# Patient Record
Sex: MALE | Race: WHITE | NOT HISPANIC OR LATINO | Employment: FULL TIME | URBAN - NONMETROPOLITAN AREA
[De-identification: names, ages, dates, MRNs, and addresses within clinical notes are randomized per-mention and may not be internally consistent; named-entity substitution may affect disease eponyms.]

---

## 2023-06-16 ASSESSMENT — PROMIS GLOBAL HEALTH SCALE
RATE_PHYSICAL_HEALTH: VERY GOOD
RATE_QUALITY_OF_LIFE: GOOD
RATE_SOCIAL_SATISFACTION: FAIR
CARRYOUT_PHYSICAL_ACTIVITIES: COMPLETELY
RATE_AVERAGE_FATIGUE: MILD
CARRYOUT_SOCIAL_ACTIVITIES: VERY GOOD
EMOTIONAL_PROBLEMS: RARELY
RATE_GENERAL_HEALTH: VERY GOOD
RATE_AVERAGE_PAIN: 2
RATE_MENTAL_HEALTH: GOOD

## 2023-06-17 PROBLEM — E78.2 MIXED HYPERLIPIDEMIA: Status: ACTIVE | Noted: 2023-06-17

## 2023-06-17 PROBLEM — B35.1 ONYCHOMYCOSIS OF TOENAIL: Status: ACTIVE | Noted: 2023-06-17

## 2023-06-17 PROBLEM — K64.8 INTERNAL HEMORRHOIDS: Status: ACTIVE | Noted: 2023-06-17

## 2023-06-17 PROBLEM — K64.4 EXTERNAL HEMORRHOIDS: Status: ACTIVE | Noted: 2023-06-17

## 2023-06-17 PROBLEM — K57.90 DIVERTICULOSIS: Status: ACTIVE | Noted: 2023-06-17

## 2023-06-17 RX ORDER — MULTIVITAMIN
TABLET ORAL
COMMUNITY

## 2023-06-17 RX ORDER — TERBINAFINE HYDROCHLORIDE 250 MG/1
1 TABLET ORAL DAILY
COMMUNITY
Start: 2022-08-03 | End: 2023-06-19 | Stop reason: ALTCHOICE

## 2023-06-19 ENCOUNTER — OFFICE VISIT (OUTPATIENT)
Dept: PRIMARY CARE | Facility: CLINIC | Age: 63
End: 2023-06-19
Payer: COMMERCIAL

## 2023-06-19 ENCOUNTER — LAB (OUTPATIENT)
Dept: LAB | Facility: LAB | Age: 63
End: 2023-06-19
Payer: COMMERCIAL

## 2023-06-19 VITALS
WEIGHT: 151.8 LBS | BODY MASS INDEX: 23.83 KG/M2 | DIASTOLIC BLOOD PRESSURE: 75 MMHG | HEIGHT: 67 IN | SYSTOLIC BLOOD PRESSURE: 122 MMHG | OXYGEN SATURATION: 97 % | RESPIRATION RATE: 12 BRPM | HEART RATE: 59 BPM | TEMPERATURE: 97.7 F

## 2023-06-19 DIAGNOSIS — B35.1 ONYCHOMYCOSIS OF TOENAIL: Primary | ICD-10-CM

## 2023-06-19 DIAGNOSIS — E78.2 MIXED HYPERLIPIDEMIA: Primary | ICD-10-CM

## 2023-06-19 DIAGNOSIS — E78.2 MIXED HYPERLIPIDEMIA: ICD-10-CM

## 2023-06-19 LAB
ALANINE AMINOTRANSFERASE (SGPT) (U/L) IN SER/PLAS: 21 U/L (ref 10–52)
ALBUMIN (G/DL) IN SER/PLAS: 4.5 G/DL (ref 3.4–5)
ALKALINE PHOSPHATASE (U/L) IN SER/PLAS: 45 U/L (ref 33–136)
ANION GAP IN SER/PLAS: 12 MMOL/L (ref 10–20)
ASPARTATE AMINOTRANSFERASE (SGOT) (U/L) IN SER/PLAS: 21 U/L (ref 9–39)
BILIRUBIN TOTAL (MG/DL) IN SER/PLAS: 0.7 MG/DL (ref 0–1.2)
CALCIUM (MG/DL) IN SER/PLAS: 9.8 MG/DL (ref 8.6–10.6)
CARBON DIOXIDE, TOTAL (MMOL/L) IN SER/PLAS: 30 MMOL/L (ref 21–32)
CHLORIDE (MMOL/L) IN SER/PLAS: 105 MMOL/L (ref 98–107)
CHOLESTEROL (MG/DL) IN SER/PLAS: 248 MG/DL (ref 0–199)
CHOLESTEROL IN HDL (MG/DL) IN SER/PLAS: 104.1 MG/DL
CHOLESTEROL/HDL RATIO: 2.4
CREATININE (MG/DL) IN SER/PLAS: 1.05 MG/DL (ref 0.5–1.3)
GFR MALE: 80 ML/MIN/1.73M2
GLUCOSE (MG/DL) IN SER/PLAS: 97 MG/DL (ref 74–99)
LDL: 136 MG/DL (ref 0–99)
POTASSIUM (MMOL/L) IN SER/PLAS: 4.7 MMOL/L (ref 3.5–5.3)
PROTEIN TOTAL: 7.3 G/DL (ref 6.4–8.2)
SODIUM (MMOL/L) IN SER/PLAS: 142 MMOL/L (ref 136–145)
TRIGLYCERIDE (MG/DL) IN SER/PLAS: 38 MG/DL (ref 0–149)
UREA NITROGEN (MG/DL) IN SER/PLAS: 29 MG/DL (ref 6–23)
VLDL: 8 MG/DL (ref 0–40)

## 2023-06-19 PROCEDURE — 1036F TOBACCO NON-USER: CPT | Performed by: FAMILY MEDICINE

## 2023-06-19 PROCEDURE — 99396 PREV VISIT EST AGE 40-64: CPT | Performed by: FAMILY MEDICINE

## 2023-06-19 PROCEDURE — 36415 COLL VENOUS BLD VENIPUNCTURE: CPT

## 2023-06-19 PROCEDURE — 80061 LIPID PANEL: CPT

## 2023-06-19 PROCEDURE — 80053 COMPREHEN METABOLIC PANEL: CPT

## 2023-06-19 RX ORDER — TERBINAFINE HYDROCHLORIDE 250 MG/1
250 TABLET ORAL DAILY
Qty: 30 TABLET | Refills: 0 | Status: SHIPPED | OUTPATIENT
Start: 2023-06-19 | End: 2023-07-19

## 2023-06-19 NOTE — PROGRESS NOTES
"Subjective   Patient ID: Aaron Johnston is a 63 y.o. male who presents for Annual Exam.  HPI  Patient of Dr. Rivero, scheduled for their CPE  Last PE was 8/1/22 - make sure insurance is okay with calendar year and not full year.   UTD on vaccines  Labs 8/2022    Had an episode of lightheadedess last week - checked BP and was 155/85  Up about 5 lbs from last year per patient    Decreased exercise due to being \"tired of running\", considering kayaking, rides bike and walks. Also does some resistance training  No chest pain or SOB with exercise  Drinking more water    No constipation or diarrhea    Likes wine, but has been cutting  Diet \"sucks\" per patient, does not like fruits and vegetables, gets at least one serving per day    Had rotator cuff repaired in 2008 - has some aches and pains    Has pain in the feet a lot of days, but only with barefoot walking, better with any shoes.  Has toenail fungus  Has pain in the right foot third toe.   Has never seen podiatry.     Review of Systems    Objective   Physical Exam  Constitutional:       General: He is not in acute distress.  HENT:      Head: Normocephalic and atraumatic.      Right Ear: Tympanic membrane, ear canal and external ear normal.      Left Ear: Tympanic membrane, ear canal and external ear normal.      Nose: Nose normal.      Mouth/Throat:      Mouth: Mucous membranes are moist.      Pharynx: No posterior oropharyngeal erythema.   Eyes:      General: No scleral icterus.     Extraocular Movements: Extraocular movements intact.      Pupils: Pupils are equal, round, and reactive to light.   Cardiovascular:      Rate and Rhythm: Normal rate and regular rhythm.      Pulses: Normal pulses.      Heart sounds: No murmur heard.  Pulmonary:      Effort: Pulmonary effort is normal. No respiratory distress.      Breath sounds: Normal breath sounds. No wheezing.   Abdominal:      General: Bowel sounds are normal. There is no distension.      Palpations: Abdomen is soft. "      Tenderness: There is no abdominal tenderness.   Musculoskeletal:         General: Normal range of motion.      Cervical back: Neck supple. No rigidity.      Right lower leg: No edema.      Left lower leg: No edema.   Lymphadenopathy:      Cervical: No cervical adenopathy.   Skin:     General: Skin is warm and dry.      Findings: No rash.   Neurological:      General: No focal deficit present.      Mental Status: He is alert and oriented to person, place, and time.   Psychiatric:         Mood and Affect: Mood normal.         Thought Content: Thought content normal.         Assessment/Plan   Problem List Items Addressed This Visit       Mixed hyperlipidemia - Primary    Relevant Orders    Lipid panel    Comprehensive metabolic panel

## 2023-06-19 NOTE — PATIENT INSTRUCTIONS
[facility] (Dr. Hendricks or partners) - [address] between our office and [place].   Dr. Woods with  - various locations - we can help schedule if needed.    We will complete your labs and form and send back in for you.     You are eligible for a coronary CT scan to look at the blood vessels.

## 2023-07-17 ENCOUNTER — TELEPHONE (OUTPATIENT)
Dept: PRIMARY CARE | Facility: CLINIC | Age: 63
End: 2023-07-17
Payer: COMMERCIAL

## 2023-07-17 NOTE — TELEPHONE ENCOUNTER
Pt had asked us to fill forms out for based on his 2023 CPE. I believe there was some confusion upon review of the previous messages. We needed him to schedule in advance for his next appt next year, pt thought he needed another apt this year to complete these forms I called pt to try to explain this to him but had to leave a message.

## 2023-07-17 NOTE — TELEPHONE ENCOUNTER
----- Message from Topher Johnston sent at 7/17/2023  2:58 PM EDT -----  Regarding: appointment  Contact: [phone]  I just had an appointment with Dr. Pedersen.  Doesn't that count?

## 2024-07-17 ENCOUNTER — APPOINTMENT (OUTPATIENT)
Dept: PRIMARY CARE | Facility: CLINIC | Age: 64
End: 2024-07-17
Payer: COMMERCIAL

## 2024-07-22 ENCOUNTER — APPOINTMENT (OUTPATIENT)
Dept: PRIMARY CARE | Facility: CLINIC | Age: 64
End: 2024-07-22
Payer: COMMERCIAL

## 2024-07-24 NOTE — PROGRESS NOTES
"Subjective   Patient ID: Topher Johnston \"Skip\" is a 64 y.o. male who presents for Annual Exam (Aaron is here for his annual exam. Pt states he has some discomfort in his back recently. This is not a new issue however he would like to discuss it to see if there is anything else he can do. ).  HPI  Patient of Dr. Rivero scheduled for CPE  History of elevated cholesterol  History of onychomycosis - did the lamisil briefly and helped a little, but not gone    Due for lipid/CMP/PSA  Colonoscopy 2019 - normal - 10 years  Tdap 2021  Will be due for prevnar 20 next year  Has had shingles vaccine x2    Rotator cuff surgery - 1/2008 - add to surg history    Low back, sharp shooting - sharp pain is fleeting, but will need to lay down after - ibuprofen, rest will make it go away in a day  Does some inversion at home, still able to exercise.   Would like to know if other options for him to keep this away in the long term    Had plantar fasciitis last year which is now resolved.    Still running, biking, now kayaking      Objective   Visit Vitals  /77 (BP Location: Right arm, Patient Position: Sitting, BP Cuff Size: Adult)   Pulse 61   Temp 36.8 °C (98.3 °F) (Temporal)   Ht 1.727 m (5' 8\")   Wt 70.3 kg (155 lb)   SpO2 95%   BMI 23.57 kg/m²   Smoking Status Never   BSA 1.84 m²      Physical Exam  Constitutional:       General: He is not in acute distress.  HENT:      Head: Normocephalic and atraumatic.      Right Ear: Tympanic membrane, ear canal and external ear normal.      Left Ear: Tympanic membrane, ear canal and external ear normal.      Nose: Nose normal.      Mouth/Throat:      Mouth: Mucous membranes are moist.      Pharynx: No posterior oropharyngeal erythema.   Eyes:      General: No scleral icterus.     Extraocular Movements: Extraocular movements intact.      Pupils: Pupils are equal, round, and reactive to light.   Cardiovascular:      Rate and Rhythm: Normal rate and regular rhythm.      Pulses: Normal pulses. "      Heart sounds: No murmur heard.  Pulmonary:      Effort: Pulmonary effort is normal. No respiratory distress.      Breath sounds: Normal breath sounds. No wheezing.   Abdominal:      General: Bowel sounds are normal. There is no distension.      Palpations: Abdomen is soft.      Tenderness: There is no abdominal tenderness.   Musculoskeletal:         General: Normal range of motion.      Cervical back: Neck supple. No rigidity.      Right lower leg: No edema.      Left lower leg: No edema.   Lymphadenopathy:      Cervical: No cervical adenopathy.   Skin:     General: Skin is warm and dry.      Findings: No rash.   Neurological:      General: No focal deficit present.      Mental Status: He is alert and oriented to person, place, and time.   Psychiatric:         Mood and Affect: Mood normal.         Thought Content: Thought content normal.         Assessment/Plan   Problem List Items Addressed This Visit    None  Visit Diagnoses       Routine general medical examination at a health care facility    -  Primary    Relevant Orders    Prostate Specific Antigen, Screen    Comprehensive Metabolic Panel    Lipid Panel    Chronic bilateral low back pain with sciatica, sciatica laterality unspecified        Relevant Orders    Referral to [facility]    Referral to Physical Therapy

## 2024-07-29 ENCOUNTER — APPOINTMENT (OUTPATIENT)
Dept: PRIMARY CARE | Facility: CLINIC | Age: 64
End: 2024-07-29
Payer: COMMERCIAL

## 2024-07-29 ENCOUNTER — LAB (OUTPATIENT)
Dept: LAB | Facility: LAB | Age: 64
End: 2024-07-29
Payer: COMMERCIAL

## 2024-07-29 VITALS
HEART RATE: 61 BPM | SYSTOLIC BLOOD PRESSURE: 126 MMHG | BODY MASS INDEX: 23.49 KG/M2 | HEIGHT: 68 IN | DIASTOLIC BLOOD PRESSURE: 77 MMHG | TEMPERATURE: 98.3 F | WEIGHT: 155 LBS | OXYGEN SATURATION: 95 %

## 2024-07-29 DIAGNOSIS — M54.40 CHRONIC BILATERAL LOW BACK PAIN WITH SCIATICA, SCIATICA LATERALITY UNSPECIFIED: ICD-10-CM

## 2024-07-29 DIAGNOSIS — G89.29 CHRONIC BILATERAL LOW BACK PAIN WITH SCIATICA, SCIATICA LATERALITY UNSPECIFIED: ICD-10-CM

## 2024-07-29 DIAGNOSIS — Z00.00 ROUTINE GENERAL MEDICAL EXAMINATION AT A HEALTH CARE FACILITY: Primary | ICD-10-CM

## 2024-07-29 DIAGNOSIS — Z00.00 ROUTINE GENERAL MEDICAL EXAMINATION AT A HEALTH CARE FACILITY: ICD-10-CM

## 2024-07-29 PROCEDURE — 80061 LIPID PANEL: CPT

## 2024-07-29 PROCEDURE — 99396 PREV VISIT EST AGE 40-64: CPT | Performed by: FAMILY MEDICINE

## 2024-07-29 PROCEDURE — 36415 COLL VENOUS BLD VENIPUNCTURE: CPT

## 2024-07-29 PROCEDURE — 84153 ASSAY OF PSA TOTAL: CPT

## 2024-07-29 PROCEDURE — 80053 COMPREHEN METABOLIC PANEL: CPT

## 2024-07-29 PROCEDURE — 3008F BODY MASS INDEX DOCD: CPT | Performed by: FAMILY MEDICINE

## 2024-07-29 PROCEDURE — 1036F TOBACCO NON-USER: CPT | Performed by: FAMILY MEDICINE

## 2024-07-29 ASSESSMENT — PROMIS GLOBAL HEALTH SCALE
CARRYOUT_PHYSICAL_ACTIVITIES: COMPLETELY
RATE_SOCIAL_SATISFACTION: GOOD
EMOTIONAL_PROBLEMS: RARELY
RATE_MENTAL_HEALTH: VERY GOOD
RATE_AVERAGE_FATIGUE: MILD
RATE_GENERAL_HEALTH: VERY GOOD
RATE_PHYSICAL_HEALTH: VERY GOOD
RATE_QUALITY_OF_LIFE: VERY GOOD
RATE_AVERAGE_PAIN: 2
CARRYOUT_SOCIAL_ACTIVITIES: VERY GOOD

## 2024-07-29 NOTE — PATIENT INSTRUCTIONS
My recommendation for a chiropractor would be [facility] in [place] - Dr. Casillas and Gaurav who is the PT.    Medical massage is a great tool - I recommend through [facility]..     PT - I would recommend Laurie Tucker in [place] with  - you would need a referral for this - call if you desire referral.

## 2024-07-30 LAB
ALBUMIN SERPL BCP-MCNC: 4.7 G/DL (ref 3.4–5)
ALP SERPL-CCNC: 44 U/L (ref 33–136)
ALT SERPL W P-5'-P-CCNC: 24 U/L (ref 10–52)
ANION GAP SERPL CALC-SCNC: 12 MMOL/L (ref 10–20)
AST SERPL W P-5'-P-CCNC: 21 U/L (ref 9–39)
BILIRUB SERPL-MCNC: 0.6 MG/DL (ref 0–1.2)
BUN SERPL-MCNC: 30 MG/DL (ref 6–23)
CALCIUM SERPL-MCNC: 9.7 MG/DL (ref 8.6–10.6)
CHLORIDE SERPL-SCNC: 102 MMOL/L (ref 98–107)
CHOLEST SERPL-MCNC: 265 MG/DL (ref 0–199)
CHOLESTEROL/HDL RATIO: 2.5
CO2 SERPL-SCNC: 31 MMOL/L (ref 21–32)
CREAT SERPL-MCNC: 0.99 MG/DL (ref 0.5–1.3)
EGFRCR SERPLBLD CKD-EPI 2021: 85 ML/MIN/1.73M*2
GLUCOSE SERPL-MCNC: 99 MG/DL (ref 74–99)
HDLC SERPL-MCNC: 106.8 MG/DL
LDLC SERPL CALC-MCNC: 149 MG/DL
NON HDL CHOLESTEROL: 158 MG/DL (ref 0–149)
POTASSIUM SERPL-SCNC: 5 MMOL/L (ref 3.5–5.3)
PROT SERPL-MCNC: 7.3 G/DL (ref 6.4–8.2)
PSA SERPL-MCNC: 1.13 NG/ML
SODIUM SERPL-SCNC: 140 MMOL/L (ref 136–145)
TRIGL SERPL-MCNC: 45 MG/DL (ref 0–149)
VLDL: 9 MG/DL (ref 0–40)

## 2024-08-20 NOTE — PROGRESS NOTES
"Physical Therapy    Physical Therapy Lumbar Spine Evaluation    Patient Name: Topher Johnston  MRN: [id number]  Today's Date: 8/21/2024  Time Calculation  Start Time: 1312  Stop Time: 1410  Time Calculation (min): 58 min  PT Evaluation Time Entry  PT Evaluation (Low) Time Entry: 50  PT Therapeutic Procedures Time Entry  Therapeutic Exercise Time Entry: 9                 Payor: [employer] [facility] / Plan: [employer] / Product Type: *No Product type* /     Reason for Referral: LBP  General Comment: Visit 1 of 40    Current Problem  Problem List Items Addressed This Visit             ICD-10-CM    Chronic bilateral low back pain with sciatica - Primary M54.40, G89.29    Relevant Orders    Follow Up In Physical Therapy        Precautions  Precautions  Precautions Comment: None       Pain  Pain Assessment: 0-10  0-10 (Numeric) Pain Score: 0 - No pain  Pain at worst: 8      SUBJECTIVE:   Pain location: LBP  Sharp pain-experienced this when drying dog off       Onset: April 1986.  Notes he moved a very heavy desk and back went out   Intermittent.  Episodic   Hx of sciatic pain this year  Hx of injection in the ER  He has had an episode about 4 yrs ago when he couldn't walk or even go to the bathroom     Hx of plantar fasciitis earlier this year    -N/T  -B/B  -Cough/sneeze/strain    Reviewed medical hx form     Imaging:  None     Aggravating factors:  Bending, \"moving wrong\"  Stiffness with rising sometimes    Alleviating factors:  Ibuprofen     Prior level of function:  Previously independent with all functional activity    Functional limitations:  See above     Home setup:  Reviewed and no concern    Work:  -sitting 50% of the time    Patient stated goal:  Determine what might be done to lessen/eliminate challenges    Prior tx:  None     Objective:    Lower Extremity Strength: (WNL unless documented below) (p=pain)  MMT 5/5 max RIGHT LEFT   Hip Flexion 4+ 4+   Hip Extension 4+ 4+   Hip " Abduction 4+ 4+   Hip Adduction  4+ 4+     Lower Extremity Flexibility: (WNL unless documented below) (p=pain)  Flexibility  RIGHT LEFT   Quad 6 in  6 in    Hamstring  46 46   Hip flexor  Mod loss Mod loss   Piriformis  Mod loss Mod loss      Lower Extremity ROM: (WNL unless documented below) (p=pain)   ROM in Degrees  RIGHT LEFT   Hip ER Min loss Min loss   Hip IR  Min loss Min loss     Lumbar ROM:   Flexion Min loss, HS tightness, tightness in LB   Extension Min loss    RIGHT LEFT   Side Bend Mod loss, L LBP Mod loss, L LBP     Isai Lumbar repeated movements:   Pretest Symptoms: 0   RFIS Produced R LBP 2/10   GENNARO Abolished    REIL         Special tests: (WNL unless documented below)     Myotomes: (WNL unless documented below)     Dermatomes: (WNL unless documented below)     Posture: Pt responded well to lumbar roll   Joint mobility: hypomobile lumbar spine   Palpation: TTP R upper lumbar paraspinals     Outcome Measure:  Other Measures  Oswestry Disablity Index (MARCUS): 8%      TREATMENT:  Initial evaluation completed. Issued and reviewed HEP with pt that included:  Access Code: [email or web address]  URL: [email or web address]  Date: 08/21/2024  Prepared by: Laurie Tucker    Exercises  - Prone Press Up  - 7 x weekly - 10 reps - every 2-3 hours frequency   - Standing Lumbar Extension  - 7 x weekly - 10 reps - every 2-3 hours frequency   - Seated Correct Posture  - 7 x weekly  - Supine Hamstring Stretch  - 1 x daily - 7 x weekly - 2 reps - 30 seconds hold  - Supine Piriformis Stretch with Leg Straight  - 1 x daily - 7 x weekly - 2 reps - 30 seconds hold    Patient Education  - Lumbar Disk Herniation    Pt educated on the importance of monitoring symptoms for centralization and peripheralization with all activity and exercises.       ASSESSEMENT  The pt presents with signs and symptoms consistent with the Physical Therapy diagnosis of Intermittent back pain and weakness. No radicular symptoms  currently however notes hx of radicular symptoms in the past. No red flags.   Pt demonstrates decreased bilateral LE tightness, decreased Lumbar ROM. Suspect possible lumbar derangement. Trial of lumbar extension with close monitoring of symptoms. Pt will benefit from skilled physical therapy to reduce impairments in order to return to prior level of function, reduce pain, increase strength and ROM and improve overall posture.     The physical therapy prognosis is good for the patient to achieve their goals.   The pt tolerated therapy treatment today well with no adverse effects.    Personal factors/barriers to learning that impact therapy include:  Chronicity  Clinical presentation: Stable and/or uncomplicated characteristics  Level of clinical decision making is Low    PLAN  The pt will be seen 1 time a week ever other week for 2-3 visits     The pt has been educated about the risks and benefits of physical therapy including manual therapy treatments and gives consent for treatment.     The patient will benefit from physical therapy treatment to include: therapeutic exercises, therapeutic activities, neurological re-education, manual therapy, modalities, and a home exercise program.       Goals:  Active       PT Problem       Reduce pain at worst to 2/10 with all functional and recreational activity.        Start:  08/21/24    Expected End:  11/19/24            Increase by > or = 25% without increased pain to perform dressing/bathing/grooming tasks and other function tasks         Start:  08/21/24    Expected End:  11/19/24            Increase by > or = 1/2 mm grade to improve postural awareness and body mechanics to perform daily tasks without increased pain/compensation          Start:  08/21/24    Expected End:  11/19/24            Pt to have decrease in Oswestry by 10% to display increased overall function.        Start:  08/21/24    Expected End:  11/19/24            Patient will demonstrate independence in  home program for support of progression       Start:  08/21/24    Expected End:  11/19/24

## 2024-08-21 ENCOUNTER — EVALUATION (OUTPATIENT)
Dept: PHYSICAL THERAPY | Facility: CLINIC | Age: 64
End: 2024-08-21
Payer: COMMERCIAL

## 2024-08-21 DIAGNOSIS — M54.40 CHRONIC BILATERAL LOW BACK PAIN WITH SCIATICA, SCIATICA LATERALITY UNSPECIFIED: Primary | ICD-10-CM

## 2024-08-21 DIAGNOSIS — G89.29 CHRONIC BILATERAL LOW BACK PAIN WITH SCIATICA, SCIATICA LATERALITY UNSPECIFIED: Primary | ICD-10-CM

## 2024-08-21 PROBLEM — M54.41 CHRONIC BILATERAL LOW BACK PAIN WITH SCIATICA: Status: ACTIVE | Noted: 2024-08-21

## 2024-08-21 PROBLEM — M54.42 CHRONIC BILATERAL LOW BACK PAIN WITH SCIATICA: Status: ACTIVE | Noted: 2024-08-21

## 2024-08-21 PROCEDURE — 97161 PT EVAL LOW COMPLEX 20 MIN: CPT | Mod: GP | Performed by: PHYSICAL THERAPIST

## 2024-08-21 PROCEDURE — 97110 THERAPEUTIC EXERCISES: CPT | Mod: GP | Performed by: PHYSICAL THERAPIST

## 2024-08-21 ASSESSMENT — PATIENT HEALTH QUESTIONNAIRE - PHQ9
2. FEELING DOWN, DEPRESSED OR HOPELESS: NOT AT ALL
SUM OF ALL RESPONSES TO PHQ9 QUESTIONS 1 AND 2: 0
1. LITTLE INTEREST OR PLEASURE IN DOING THINGS: NOT AT ALL

## 2024-08-21 ASSESSMENT — PAIN SCALES - GENERAL: PAINLEVEL_OUTOF10: 0 - NO PAIN

## 2024-08-21 ASSESSMENT — ENCOUNTER SYMPTOMS
OCCASIONAL FEELINGS OF UNSTEADINESS: 0
LOSS OF SENSATION IN FEET: 0
DEPRESSION: 0

## 2024-08-21 ASSESSMENT — PAIN - FUNCTIONAL ASSESSMENT: PAIN_FUNCTIONAL_ASSESSMENT: 0-10

## 2024-09-11 ENCOUNTER — TREATMENT (OUTPATIENT)
Dept: PHYSICAL THERAPY | Facility: CLINIC | Age: 64
End: 2024-09-11
Payer: COMMERCIAL

## 2024-09-11 DIAGNOSIS — M54.40 CHRONIC BILATERAL LOW BACK PAIN WITH SCIATICA, SCIATICA LATERALITY UNSPECIFIED: ICD-10-CM

## 2024-09-11 DIAGNOSIS — G89.29 CHRONIC BILATERAL LOW BACK PAIN WITH SCIATICA, SCIATICA LATERALITY UNSPECIFIED: ICD-10-CM

## 2024-09-11 PROCEDURE — 97110 THERAPEUTIC EXERCISES: CPT | Mod: GP | Performed by: PHYSICAL THERAPIST

## 2024-09-11 PROCEDURE — 97140 MANUAL THERAPY 1/> REGIONS: CPT | Mod: GP | Performed by: PHYSICAL THERAPIST

## 2024-09-11 ASSESSMENT — PAIN SCALES - GENERAL: PAINLEVEL_OUTOF10: 1

## 2024-09-11 ASSESSMENT — PAIN - FUNCTIONAL ASSESSMENT: PAIN_FUNCTIONAL_ASSESSMENT: 0-10

## 2024-09-11 NOTE — PROGRESS NOTES
Physical Therapy Treatment    Patient Name: Topher Johnsotn  MRN: [id number]  Today's Date: 9/11/2024  Time Calculation  Start Time: 1402  Stop Time: 1441  Time Calculation (min): 39 min     PT Therapeutic Procedures Time Entry  Manual Therapy Time Entry: 12  Therapeutic Exercise Time Entry: 27                 Payor: [employer] [facility] / Plan: [employer] / Product Type: *No Product type* /     Reason for Referral: LBP  General Comment: Visit 2 of 40    Current Problem:  Problem List Items Addressed This Visit             ICD-10-CM    Chronic bilateral low back pain with sciatica M54.40, G89.29       Subjective   Pt notes a lot of stiffness with prone press ups and so he cut reps to 7  He has not done HS stretches   After he runs his back hurts some  Went to rec and ran on TM without inclines and this was not as bed.  He has been drinking more water     HEP compliance: partially-due to work schedule     Pain:  Pain Assessment: 0-10  0-10 (Numeric) Pain Score: 1  Pain location: mid-low back all the way across    Precautions:  Precautions  Precautions Comment: None      Objective   No objective measures taken this visit    Therapeutic exercise  Upright bike L2 x 5 min-held   HS stretch 2nd step x 1 min   Piriformis stretch   Hip flexor stretch   Lean back YTB 2x10  OA/OL x 10   REIL with sag x 10     SB   Tband anti rotation   Plank   Superman     Manual   Rotational mobs throughout lumbar spine  Gentle PA mobs thoracic and lumbar spine   STM to lumbar paraspinals    HEP  Access Code: [email or web address]  URL: [email or web address]  Date: 09/11/2024  Prepared by: Laurie Tucker    Exercises  - Prone Press Up  - 7 x weekly - 10 reps - every 2-3 hours frequency   - Standing Lumbar Extension  - 7 x weekly - 10 reps - every 2-3 hours frequency   - Seated Correct Posture  - 7 x weekly  - Supine Hamstring Stretch  - 1 x daily - 7 x weekly - 2 reps - 30 seconds hold  - Supine Piriformis Stretch with  Leg Straight  - 1 x daily - 7 x weekly - 2 reps - 30 seconds hold  - Prone Alternating Arm and Leg Lifts  - 1 x daily - 3-4 x weekly - 2-3 sets - 10 reps  - Superman on Table  - 1 x daily - 3-4 x weekly - 2-3 sets - 10 reps    Patient Education  - Lumbar Disk Herniation     Assessment  Pt did well with exercises today. Reviewed press ups with sag. TTP in upper lumbar paraspinals. Some localized discomfort with PA mobs. Otherwise tolerated session well.     Plan  Continue to progress POC as tolerated by patient to improve strength, mobility and overall function    Goals:  Active       PT Problem       Reduce pain at worst to 2/10 with all functional and recreational activity.        Start:  08/21/24    Expected End:  11/19/24            Increase by > or = 25% without increased pain to perform dressing/bathing/grooming tasks and other function tasks         Start:  08/21/24    Expected End:  11/19/24            Increase by > or = 1/2 mm grade to improve postural awareness and body mechanics to perform daily tasks without increased pain/compensation          Start:  08/21/24    Expected End:  11/19/24            Pt to have decrease in Oswestry by 10% to display increased overall function.        Start:  08/21/24    Expected End:  11/19/24            Patient will demonstrate independence in home program for support of progression       Start:  08/21/24    Expected End:  11/19/24

## 2024-09-25 ENCOUNTER — DOCUMENTATION (OUTPATIENT)
Dept: PHYSICAL THERAPY | Facility: CLINIC | Age: 64
End: 2024-09-25
Payer: COMMERCIAL

## 2024-09-25 NOTE — PROGRESS NOTES
"Physical Therapy                 Therapy Communication Note    Patient Name: Topher Johnston \"Skip\"  MRN: [id number]  Department:   Room: Room/bed info not found  Today's Date: 9/25/2024     Discipline: Physical Therapy    Missed Time: No Show      "

## 2025-07-11 NOTE — PROGRESS NOTES
"Subjective   Patient ID: Topher Johnston \"Skip\" is a 65 y.o. male who presents for Annual Exam (Pt presents for CPE. Pt states no concerns to discuss.).  Due for lipid/CMP/PSA  Colonoscopy 2019 - normal - 10 years  Tdap 2021  DUE for prevnar 20   Has had shingles vaccine x2     History of Present Illness  Patient is here for his CPE.     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 He has not undergone an EKG or stress test to his knowledge. He maintains regular dental and eye check-ups and has been diagnosed with early-stage cataracts, which are reportedly normal. He has not received the pneumonia vaccine. He occasionally experiences hay fever symptoms, but they are not severe. He has noticed some spots on his body and is seeking advice on whether they should be examined.    He had a single episode of lightheadedness lasting approximately 15 minutes, which he attributes to hot weather and excessive coffee consumption. He reports no heart palpitations or irregular rhythms during this episode. 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    He has been referred to a physical therapist for his back issues, which have shown improvement. He has reduced his running activities and participated in a bike race in [place] this year. He continues to have a toenail issue, but it is not causing him any pain. He had a severe case of plantar fasciitis, which has since resolved and has not recurred.    Occupations: Running for city [place]  Coffee/Tea/Caffeine-containing Drinks: Drinks a lot of coffee    PAST SURGICAL HISTORY:  He had right rotator cuff surgery in 2008 and tore his bicep right after that. He has not had any other shoulder surgeries.    Objective     /78 (BP Location: Left arm, Patient Position: Sitting, BP Cuff Size: Adult)   Pulse 58   Temp 36.4 °C (97.6 °F) (Temporal)   Resp 14   Ht 1.702 m " "(5' 7\")   Wt 69.7 kg (153 lb 11.2 oz)   SpO2 95%   BMI 24.07 kg/m²      Physical Exam  Constitutional:       General: He is not in acute distress.  HENT:      Head: Normocephalic and atraumatic.      Right Ear: Tympanic membrane, ear canal and external ear normal.      Left Ear: Tympanic membrane, ear canal and external ear normal.      Nose: Nose normal.      Mouth/Throat:      Mouth: Mucous membranes are moist.      Pharynx: No posterior oropharyngeal erythema.   Eyes:      General: No scleral icterus.     Extraocular Movements: Extraocular movements intact.      Pupils: Pupils are equal, round, and reactive to light.   Cardiovascular:      Rate and Rhythm: Normal rate and regular rhythm.      Pulses: Normal pulses.      Heart sounds: No murmur heard.  Pulmonary:      Effort: Pulmonary effort is normal. No respiratory distress.      Breath sounds: Normal breath sounds. No wheezing.   Abdominal:      General: Bowel sounds are normal. There is no distension.      Palpations: Abdomen is soft.      Tenderness: There is no abdominal tenderness.   Musculoskeletal:         General: Normal range of motion.      Cervical back: Neck supple. No rigidity.      Right lower leg: No edema.      Left lower leg: No edema.   Lymphadenopathy:      Cervical: No cervical adenopathy.   Skin:     General: Skin is warm and dry.      Findings: No rash.   Neurological:      General: No focal deficit present.      Mental Status: He is alert and oriented to person, place, and time.   Psychiatric:         Mood and Affect: Mood normal.         Thought Content: Thought content normal.          Assessment & Plan  1. CPE  - Heart and blood pressure readings are within normal limits.  - The ABCD criteria for skin lesions were discussed.  - A comprehensive blood work panel will be ordered to monitor health status, including blood sugar, kidney function, cholesterol, and prostate antigen levels. Results will be compared to the previous year's " data.  - He will receive the pneumonia vaccine today.    2. Lightheadedness.  - Experienced a single episode of lightheadedness lasting about 15 minutes, likely due to dehydration and caffeine intake.  - Advised to maintain adequate hydration.  - Recommended to sit down if feeling lightheaded again to prevent fainting.    3. Back pain.  - Reported improvement in back pain after physical therapy.  - Has cut back on running and found biking to be beneficial.  - Will continue with these modifications.    4. Health Maintenance.  - Advised to monitor any new or changing skin lesions using the ABCD criteria.  - Instructed to report any concerns regarding skin lesions.    Xenia Pedersen MD     This medical note was created with the assistance of artificial intelligence (AI) for documentation purposes. The content has been reviewed and confirmed by the healthcare provider for accuracy and completeness. Patient consented to the use of audio recording and use of AI during their visit.

## 2025-07-13 ASSESSMENT — PROMIS GLOBAL HEALTH SCALE
RATE_GENERAL_HEALTH: VERY GOOD
RATE_SOCIAL_SATISFACTION: GOOD
RATE_AVERAGE_PAIN: 2
RATE_QUALITY_OF_LIFE: VERY GOOD
RATE_AVERAGE_FATIGUE: MILD
EMOTIONAL_PROBLEMS: RARELY
RATE_PHYSICAL_HEALTH: VERY GOOD
CARRYOUT_PHYSICAL_ACTIVITIES: COMPLETELY
CARRYOUT_SOCIAL_ACTIVITIES: VERY GOOD
RATE_MENTAL_HEALTH: VERY GOOD

## 2025-07-14 ENCOUNTER — APPOINTMENT (OUTPATIENT)
Dept: PRIMARY CARE | Facility: CLINIC | Age: 65
End: 2025-07-14
Payer: COMMERCIAL

## 2025-07-14 VITALS
HEIGHT: 67 IN | RESPIRATION RATE: 14 BRPM | HEART RATE: 58 BPM | WEIGHT: 153.7 LBS | OXYGEN SATURATION: 95 % | TEMPERATURE: 97.6 F | BODY MASS INDEX: 24.12 KG/M2 | DIASTOLIC BLOOD PRESSURE: 78 MMHG | SYSTOLIC BLOOD PRESSURE: 125 MMHG

## 2025-07-14 DIAGNOSIS — Z12.5 SCREENING FOR PROSTATE CANCER: ICD-10-CM

## 2025-07-14 DIAGNOSIS — E78.2 MIXED HYPERLIPIDEMIA: ICD-10-CM

## 2025-07-14 DIAGNOSIS — Z00.00 ROUTINE GENERAL MEDICAL EXAMINATION AT A HEALTH CARE FACILITY: Primary | ICD-10-CM

## 2025-07-14 LAB
ALBUMIN SERPL-MCNC: 4.5 G/DL (ref 3.6–5.1)
ALP SERPL-CCNC: 39 U/L (ref 35–144)
ALT SERPL-CCNC: 27 U/L (ref 9–46)
ANION GAP SERPL CALCULATED.4IONS-SCNC: 8 MMOL/L (CALC) (ref 7–17)
AST SERPL-CCNC: 21 U/L (ref 10–35)
BILIRUB SERPL-MCNC: 0.8 MG/DL (ref 0.2–1.2)
BUN SERPL-MCNC: 28 MG/DL (ref 7–25)
CALCIUM SERPL-MCNC: 9.5 MG/DL (ref 8.6–10.3)
CHLORIDE SERPL-SCNC: 103 MMOL/L (ref 98–110)
CHOLEST SERPL-MCNC: 253 MG/DL
CHOLEST/HDLC SERPL: 2.3 (CALC)
CO2 SERPL-SCNC: 28 MMOL/L (ref 20–32)
CREAT SERPL-MCNC: 1.19 MG/DL (ref 0.7–1.35)
EGFRCR SERPLBLD CKD-EPI 2021: 68 ML/MIN/1.73M2
GLUCOSE SERPL-MCNC: 96 MG/DL (ref 65–99)
HDLC SERPL-MCNC: 112 MG/DL
LDLC SERPL CALC-MCNC: 127 MG/DL (CALC)
NONHDLC SERPL-MCNC: 141 MG/DL (CALC)
POTASSIUM SERPL-SCNC: 4.5 MMOL/L (ref 3.5–5.3)
PROT SERPL-MCNC: 7.1 G/DL (ref 6.1–8.1)
PSA SERPL-MCNC: 0.67 NG/ML
SODIUM SERPL-SCNC: 139 MMOL/L (ref 135–146)
TRIGL SERPL-MCNC: 47 MG/DL

## 2025-07-14 PROCEDURE — 1123F ACP DISCUSS/DSCN MKR DOCD: CPT | Performed by: FAMILY MEDICINE

## 2025-07-14 PROCEDURE — 99397 PER PM REEVAL EST PAT 65+ YR: CPT | Performed by: FAMILY MEDICINE

## 2025-07-14 PROCEDURE — 1160F RVW MEDS BY RX/DR IN RCRD: CPT | Performed by: FAMILY MEDICINE

## 2025-07-14 PROCEDURE — 90677 PCV20 VACCINE IM: CPT | Performed by: FAMILY MEDICINE

## 2025-07-14 PROCEDURE — 1036F TOBACCO NON-USER: CPT | Performed by: FAMILY MEDICINE

## 2025-07-14 PROCEDURE — 1159F MED LIST DOCD IN RCRD: CPT | Performed by: FAMILY MEDICINE

## 2025-07-14 PROCEDURE — 3008F BODY MASS INDEX DOCD: CPT | Performed by: FAMILY MEDICINE

## 2025-07-14 PROCEDURE — 90471 IMMUNIZATION ADMIN: CPT | Performed by: FAMILY MEDICINE

## 2025-07-14 ASSESSMENT — PATIENT HEALTH QUESTIONNAIRE - PHQ9
2. FEELING DOWN, DEPRESSED OR HOPELESS: NOT AT ALL
1. LITTLE INTEREST OR PLEASURE IN DOING THINGS: NOT AT ALL
SUM OF ALL RESPONSES TO PHQ9 QUESTIONS 1 AND 2: 0